# Patient Record
Sex: FEMALE | URBAN - METROPOLITAN AREA
[De-identification: names, ages, dates, MRNs, and addresses within clinical notes are randomized per-mention and may not be internally consistent; named-entity substitution may affect disease eponyms.]

---

## 2021-05-03 ENCOUNTER — OFFICE VISIT (OUTPATIENT)
Dept: URBAN - METROPOLITAN AREA CLINIC 2 | Facility: CLINIC | Age: 71
End: 2021-05-03
Payer: MEDICARE

## 2021-05-03 ENCOUNTER — DASHBOARD ENCOUNTERS (OUTPATIENT)
Age: 71
End: 2021-05-03

## 2021-05-03 ENCOUNTER — WEB ENCOUNTER (OUTPATIENT)
Dept: URBAN - METROPOLITAN AREA CLINIC 2 | Facility: CLINIC | Age: 71
End: 2021-05-03

## 2021-05-03 DIAGNOSIS — K21.9 GASTROESOPHAGEAL REFLUX DISEASE, UNSPECIFIED WHETHER ESOPHAGITIS PRESENT: ICD-10-CM

## 2021-05-03 PROCEDURE — 99204 OFFICE O/P NEW MOD 45 MIN: CPT | Performed by: INTERNAL MEDICINE

## 2021-05-03 RX ORDER — LISINOPRIL 10 MG/1
1 TABLET TABLET ORAL ONCE A DAY
Status: ACTIVE | COMMUNITY

## 2021-05-03 RX ORDER — AZELASTINE HYDROCHLORIDE 137 UG/1
1 PUFF IN EACH NOSTRIL SPRAY, METERED NASAL TWICE A DAY
Status: ACTIVE | COMMUNITY

## 2021-05-03 RX ORDER — LEVOTHYROXINE SODIUM 0.07 MG/1
1 TABLET IN THE MORNING ON AN EMPTY STOMACH TABLET ORAL ONCE A DAY
Status: ACTIVE | COMMUNITY

## 2021-05-03 NOTE — PHYSICAL EXAM PSYCH:
normal mood with appropriate affect Solaraze Counseling:  I discussed with the patient the risks of Solaraze including but not limited to erythema, scaling, itching, weeping, crusting, and pain.

## 2021-05-03 NOTE — HPI-TODAY'S VISIT:
Very pleasant 70 yr old female referred by Dr Zayda Fishman for c/o GERD with aspiration, coughing and choking. She had paraesophageal hernia repair 2012 and was asymptomatic for a while. Now having coughing and aspiration. Sometimes has foul smelling burps. Denies dysphagia. She has asthma and often takes steroids.

## 2021-05-20 ENCOUNTER — TELEPHONE ENCOUNTER (OUTPATIENT)
Dept: URBAN - METROPOLITAN AREA CLINIC 2 | Facility: CLINIC | Age: 71
End: 2021-05-20

## 2021-05-24 ENCOUNTER — OFFICE VISIT (OUTPATIENT)
Dept: URBAN - METROPOLITAN AREA SURGERY CENTER 19 | Facility: SURGERY CENTER | Age: 71
End: 2021-05-24